# Patient Record
Sex: MALE
[De-identification: names, ages, dates, MRNs, and addresses within clinical notes are randomized per-mention and may not be internally consistent; named-entity substitution may affect disease eponyms.]

---

## 2017-11-27 ENCOUNTER — HOSPITAL ENCOUNTER (EMERGENCY)
Dept: HOSPITAL 31 - C.ER | Age: 42
Discharge: HOME | End: 2017-11-27
Payer: COMMERCIAL

## 2017-11-27 VITALS — RESPIRATION RATE: 18 BRPM | HEART RATE: 75 BPM | SYSTOLIC BLOOD PRESSURE: 136 MMHG | DIASTOLIC BLOOD PRESSURE: 78 MMHG

## 2017-11-27 VITALS — BODY MASS INDEX: 24 KG/M2

## 2017-11-27 VITALS — TEMPERATURE: 97.7 F

## 2017-11-27 VITALS — OXYGEN SATURATION: 100 %

## 2017-11-27 DIAGNOSIS — S20.212A: Primary | ICD-10-CM
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
## 2017-11-27 NOTE — C.PDOC
History Of Present Illness


41 y/o male presents to ED with complaints of left sided chest pain for 6 days. 

Patient states 6 days ago while at work was moving something and hit left side 

of chest on corner of table. Patient reports pain is worse with movement or 

breathing. Patient denies shortness of breath, coughing or any other complaints 

at this time.


Time Seen by Provider: 11/27/17 18:23


Chief Complaint (Nursing): Rib Injury


History Per: Patient


History/Exam Limitations: no limitations


Onset/Duration Of Symptoms: Days


Current Symptoms Are (Timing): Still Present





Past Medical History


Reviewed: Historical Data, Nursing Documentation, Vital Signs


Vital Signs: 


 Last Vital Signs











Temp  97.7 F   11/27/17 17:59


 


Pulse  75   11/27/17 19:18


 


Resp  18   11/27/17 19:18


 


BP  136/78   11/27/17 19:18


 


Pulse Ox  100   11/27/17 19:29














- Medical History


PMH: No Chronic Diseases


Surgical History: No Surg Hx


Family History: States: No Known Family Hx





- Social History


Hx Tobacco Use: No


Hx Alcohol Use: Yes


Hx Substance Use: No





- Immunization History


Hx Tetanus Toxoid Vaccination: Yes


Hx Influenza Vaccination: No


Hx Pneumococcal Vaccination: No





Review Of Systems


Constitutional: Negative for: Fever, Chills


Cardiovascular: Positive for: Chest Pain


Respiratory: Negative for: Cough, Shortness of Breath


Gastrointestinal: Negative for: Nausea, Vomiting


Skin: Negative for: Rash





Physical Exam





- Physical Exam


Appears: Non-toxic, No Acute Distress


Skin: Normal Color, Warm, Dry, No Rash


Head: Atraumatic, Normacephalic


Eye(s): bilateral: Normal Inspection


Oral Mucosa: Moist


Neck: Normal ROM, Supple


Chest: Symmetrical, Tenderness (Left anterior chest wall near 5ht-6th rib  (-)

crepitus), No Ecchymosis, No Subcutaneous Emphysema


Cardiovascular: Rhythm Regular


Respiratory: Normal Breath Sounds, No Rales, No Rhonchi, No Wheezing


Gastrointestinal/Abdominal: Soft, No Tenderness, No Guarding, No Rebound


Extremity: Normal ROM, Capillary Refill (<2 seconds)


Neurological/Psych: Oriented x3, Normal Motor, Normal Sensation





ED Course And Treatment


O2 Sat by Pulse Oximetry: 100 (RA)


Pulse Ox Interpretation: Normal





- Radiology


CXR: Interpreted by Me


CXR Interpretation: Yes: No Acute Disease.  No: Infiltrates, Pnemothorax





Medical Decision Making


Medical Decision Making: 





Impression: rib injury


Plan: Rib and chest xray


Progress: XRay was reviewed by me and negative for fracture. Patient advised to 

take analgesics as needed





Disposition


Counseled Patient/Family Regarding: Need For Followup, Rx Given





- Disposition


Disposition: HOME/ ROUTINE


Disposition Time: 19:12


Condition: STABLE


Additional Instructions: 


Tu radiografa muestra ortiz fractura. Es muy importante que realice un 

seguimiento con ortopedia en 1-4 duvall. Se ha aplicado ortiz frula que es un yeso 

temporal. No humedezca la frula, no la any y considere ortiz bolsa de plstico. 

Theba analgsicos segn sea necesario


Prescriptions: 


Ibuprofen [Motrin] 600 mg PO Q8 #30 tab


Instructions:  Rib Contusion (ED)


Forms:  [facility] (English)


Print Language: Slovak





- POA


Present On Arrival: None





- Clinical Impression


Clinical Impression: 


 Contusion of rib on left side








- PA / NP / Resident Statement


MD/DO has reviewed & agrees with the documentation as recorded.





- Scribe Statement


The provider has reviewed the documentation as recorded by the Scribe


Cris Broderick





All medical record entries made by the Ping were at my direction and 

personally dictated by me. I have reviewed the chart and agree that the record 

accurately reflects my personal performance of the history, physical exam, 

medical decision making, and the department course for this patient. I have 

also personally directed, reviewed, and agree with the discharge instructions 

and disposition.

## 2017-11-28 NOTE — RAD
PROCEDURE:  Radiographs of the Chest and Left Ribs.



HISTORY:

left rib pain 5-7 for 2 days s.p injury



COMPARISON:

None available. 



TECHNIQUE:

Frontal radiograph of the chest and multiple oblique radiographs of 

the left ribs were obtained.



FINDINGS:



LEFT RIBS:

No fracture or focal lesion visualized.



LUNGS:

Clear.



PLEURA:

No pneumothorax or pleural fluid.



CARDIOVASCULAR:

Normal sized heart. No pulmonary vascular congestion.



OTHER FINDINGS:

None.



IMPRESSION:

Unremarkable radiographs of the chest and left ribs. No left rib 

fracture.

## 2017-12-16 ENCOUNTER — HOSPITAL ENCOUNTER (EMERGENCY)
Dept: HOSPITAL 31 - C.ER | Age: 42
Discharge: HOME | End: 2017-12-16
Payer: COMMERCIAL

## 2017-12-16 VITALS
DIASTOLIC BLOOD PRESSURE: 86 MMHG | HEART RATE: 91 BPM | TEMPERATURE: 98.2 F | SYSTOLIC BLOOD PRESSURE: 136 MMHG | RESPIRATION RATE: 19 BRPM | OXYGEN SATURATION: 100 %

## 2017-12-16 VITALS — BODY MASS INDEX: 24 KG/M2

## 2017-12-16 DIAGNOSIS — S20.212A: Primary | ICD-10-CM
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
## 2017-12-16 NOTE — C.PDOC
History Of Present Illness


42 year old male presents to the ED c/o CP and SOB starting today. Patient 

reports he feel on the left side of his chest about 15 days ago and since then 

he has been feeling CP and SOB. Patient reports the pain worsens with movement, 

deep breaths and sleeping on his left side. Patient denies fever, nausea, vomit

, diarrhea, headache, blurry vision, LOC. 


Time Seen by Provider: 12/16/17 19:41


Chief Complaint (Nursing): Chest Pain


History Per: Patient


History/Exam Limitations: no limitations


Onset/Duration Of Symptoms: Days, Waxing/Waning


Quality: "Pain"


Modifying Factors: None


Exacerbating Factors: None


Alleviating Factors: None


Recent travel outside of the [place]: No


Additional History Per: Patient





Past Medical History


Reviewed: Historical Data, Nursing Documentation, Vital Signs


Vital Signs: 


 Last Vital Signs











Temp  97.7 F   12/16/17 19:46


 


Pulse  108 H  12/16/17 19:46


 


Resp  16   12/16/17 19:46


 


BP  156/106 H  12/16/17 19:46


 


Pulse Ox  98   12/16/17 20:13














- Medical History


PMH: No Chronic Diseases


Surgical History: No Surg Hx


Family History: States: Unknown Family Hx





- Social History


Hx Tobacco Use: No


Hx Alcohol Use: Yes


Hx Substance Use: No





- Immunization History


Hx Tetanus Toxoid Vaccination: Yes


Hx Influenza Vaccination: No


Hx Pneumococcal Vaccination: No





Review Of Systems


Constitutional: Negative for: Fever, Chills


Cardiovascular: Positive for: Chest Pain, Palpitations


Respiratory: Positive for: Shortness of Breath.  Negative for: Cough


Gastrointestinal: Negative for: Nausea, Vomiting, Abdominal Pain


Musculoskeletal: Negative for: Neck Pain, Back Pain


Skin: Negative for: Rash


Neurological: Negative for: Weakness, Numbness, Headache





Physical Exam





- Physical Exam


Appears: Non-toxic, No Acute Distress


Skin: Normal Color, Warm, Dry


Head: Atraumatic, Normacephalic


Nose: No Discharge, No Deformity


Oral Mucosa: Moist


Neck: Normal ROM, Supple


Chest: Symmetrical


Cardiovascular: Rhythm Regular, No Murmur


Respiratory: Normal Breath Sounds, No Rales, No Rhonchi, No Wheezing


Gastrointestinal/Abdominal: Soft, No Tenderness, No Distention, No Rebound


Extremity: Normal ROM, No Pedal Edema, No Calf Tenderness, No Swelling


Neurological/Psych: Oriented x3, Normal Speech, Normal Cognition


Gait: Steady





ED Course And Treatment


O2 Sat by Pulse Oximetry: 98 (On RA)


Pulse Ox Interpretation: Normal





Medical Decision Making


Medical Decision Making: 


Impression : CP and SOB


Plan:


* EKG


* CXR








Disposition





- Disposition


Disposition: HOME/ ROUTINE


Disposition Time: 21:00


Condition: STABLE


Prescriptions: 


Tramadol HCl [Ultram] 50 mg PO TID PRN #20 tablet


 PRN Reason: Pain, Mild (1-3)


Instructions:  Chest Wall Pain (ED)


Forms:  [facility] (English)





- Clinical Impression


Clinical Impression: 


 Chest wall contusion








- Scribe Statement


The provider has reviewed the documentation as recorded by the Scribe





Keaton Leavitt





All medical record entries made by the Scribe were at my direction and 

personally dictated by me. I have reviewed the chart and agree that the record 

accurately reflects my personal performance of the history, physical exam, 

medical decision making, and the department course for this patient. I have 

also personally directed, reviewed, and agree with the discharge instructions 

and disposition.

## 2017-12-17 NOTE — RAD
Chest x-ray two views 



History: Chest pain. 



Comparison: 11/27/2017 



Findings: 



No focal infiltrate or effusion. 



Heart size normal limits. 



Impression: 



No focal infiltrate or effusion.

## 2018-01-17 ENCOUNTER — HOSPITAL ENCOUNTER (EMERGENCY)
Dept: HOSPITAL 31 - C.ER | Age: 43
Discharge: HOME | End: 2018-01-17
Payer: COMMERCIAL

## 2018-01-17 VITALS
DIASTOLIC BLOOD PRESSURE: 96 MMHG | OXYGEN SATURATION: 99 % | HEART RATE: 90 BPM | TEMPERATURE: 98.1 F | RESPIRATION RATE: 20 BRPM | SYSTOLIC BLOOD PRESSURE: 150 MMHG

## 2018-01-17 VITALS — BODY MASS INDEX: 24 KG/M2

## 2018-01-17 DIAGNOSIS — R07.89: Primary | ICD-10-CM

## 2018-01-17 LAB
ALBUMIN SERPL-MCNC: 4.2 G/DL (ref 3.5–5)
ALBUMIN/GLOB SERPL: 1.4 {RATIO} (ref 1–2.1)
ALT SERPL-CCNC: 37 U/L (ref 21–72)
APTT BLD: 34 SECONDS (ref 21–34)
AST SERPL-CCNC: 27 U/L (ref 17–59)
BASOPHILS # BLD AUTO: 0.1 K/UL (ref 0–0.2)
BASOPHILS NFR BLD: 0.7 % (ref 0–2)
BILIRUB UR-MCNC: NEGATIVE MG/DL
BUN SERPL-MCNC: 11 MG/DL (ref 9–20)
CALCIUM SERPL-MCNC: 9.1 MG/DL (ref 8.6–10.4)
D DIMER: < 200 NG/MLDDU (ref 0–243)
EOSINOPHIL # BLD AUTO: 0.1 K/UL (ref 0–0.7)
EOSINOPHIL NFR BLD: 0.9 % (ref 0–4)
ERYTHROCYTE [DISTWIDTH] IN BLOOD BY AUTOMATED COUNT: 13.5 % (ref 11.5–14.5)
GFR NON-AFRICAN AMERICAN: > 60
GLUCOSE UR STRIP-MCNC: NORMAL MG/DL
HGB BLD-MCNC: 16.1 G/DL (ref 12–18)
INR PPP: 1.1
LEUKOCYTE ESTERASE UR-ACNC: (no result) LEU/UL
LYMPHOCYTES # BLD AUTO: 1.2 K/UL (ref 1–4.3)
LYMPHOCYTES NFR BLD AUTO: 12.5 % (ref 20–40)
MAGNESIUM SERPL-MCNC: 1.7 MG/DL (ref 1.6–2.3)
MCH RBC QN AUTO: 29.6 PG (ref 27–31)
MCHC RBC AUTO-ENTMCNC: 35.2 G/DL (ref 33–37)
MCV RBC AUTO: 84.2 FL (ref 80–94)
MONOCYTES # BLD: 0.5 K/UL (ref 0–0.8)
MONOCYTES NFR BLD: 5.1 % (ref 0–10)
NEUTROPHILS # BLD: 7.7 K/UL (ref 1.8–7)
NEUTROPHILS NFR BLD AUTO: 80.8 % (ref 50–75)
NRBC BLD AUTO-RTO: 0 % (ref 0–2)
PH UR STRIP: 8 [PH] (ref 5–8)
PLATELET # BLD: 275 K/UL (ref 130–400)
PMV BLD AUTO: 9.1 FL (ref 7.2–11.7)
PROT UR STRIP-MCNC: NEGATIVE MG/DL
PROTHROMBIN TIME: 12.4 SECONDS (ref 9.7–12.2)
RBC # BLD AUTO: 5.45 MIL/UL (ref 4.4–5.9)
RBC # UR STRIP: NEGATIVE /UL
SP GR UR STRIP: 1.02 (ref 1–1.03)
URINE NITRATE: NEGATIVE
UROBILINOGEN UR-MCNC: 4 MG/DL (ref 0.2–1)
WBC # BLD AUTO: 9.5 K/UL (ref 4.8–10.8)

## 2018-01-17 PROCEDURE — 83735 ASSAY OF MAGNESIUM: CPT

## 2018-01-17 PROCEDURE — 80345 DRUG SCREENING BARBITURATES: CPT

## 2018-01-17 PROCEDURE — 83992 ASSAY FOR PHENCYCLIDINE: CPT

## 2018-01-17 PROCEDURE — 85610 PROTHROMBIN TIME: CPT

## 2018-01-17 PROCEDURE — 84484 ASSAY OF TROPONIN QUANT: CPT

## 2018-01-17 PROCEDURE — 85025 COMPLETE CBC W/AUTO DIFF WBC: CPT

## 2018-01-17 PROCEDURE — 80349 CANNABINOIDS NATURAL: CPT

## 2018-01-17 PROCEDURE — 81001 URINALYSIS AUTO W/SCOPE: CPT

## 2018-01-17 PROCEDURE — 80053 COMPREHEN METABOLIC PANEL: CPT

## 2018-01-17 PROCEDURE — 96374 THER/PROPH/DIAG INJ IV PUSH: CPT

## 2018-01-17 PROCEDURE — 99284 EMERGENCY DEPT VISIT MOD MDM: CPT

## 2018-01-17 PROCEDURE — 80358 DRUG SCREENING METHADONE: CPT

## 2018-01-17 PROCEDURE — 85730 THROMBOPLASTIN TIME PARTIAL: CPT

## 2018-01-17 PROCEDURE — 80324 DRUG SCREEN AMPHETAMINES 1/2: CPT

## 2018-01-17 PROCEDURE — 80346 BENZODIAZEPINES1-12: CPT

## 2018-01-17 PROCEDURE — 80361 OPIATES 1 OR MORE: CPT

## 2018-01-17 PROCEDURE — 80353 DRUG SCREENING COCAINE: CPT

## 2018-01-17 PROCEDURE — 85378 FIBRIN DEGRADE SEMIQUANT: CPT

## 2018-01-17 NOTE — C.PDOC
History Of Present Illness


Pt c/o left lower chest/side pain after injury 3 months ago. 


Time Seen by Provider: 01/17/18 14:25


Chief Complaint (Nursing): Rib Injury


History Per: Patient


Onset/Duration Of Symptoms: Days (about 3 months)


Current Symptoms Are (Timing): Still Present


Severity: Moderate


Quality: "Pain"


Associated Symptoms: denies: Diaphoresis, Syncope


Modifying Factors: Other Indicated Below


Exacerbating Factors: Turning, Movement, Deep Breathing


Additional History Per: Prior Records





Past Medical History


Reviewed: Historical Data, Nursing Documentation, Vital Signs


Vital Signs: 





 Last Vital Signs











Temp  98.4 F   01/17/18 14:16


 


Pulse  91 H  01/17/18 14:16


 


Resp  18   01/17/18 14:16


 


BP  167/111 H  01/17/18 14:16


 


Pulse Ox  97   01/17/18 14:16














- Medical History


PMH: No Chronic Diseases


Surgical History: No Surg Hx


Family History: States: Unknown Family Hx





- Social History


Hx Tobacco Use: No


Hx Alcohol Use: Yes


Hx Substance Use: No





- Immunization History


Hx Tetanus Toxoid Vaccination: Yes


Hx Influenza Vaccination: No


Hx Pneumococcal Vaccination: No





Review Of Systems


Except As Marked, All Systems Reviewed And Found Negative.


Constitutional: Negative for: Fever, Weakness


Respiratory: Negative for: Hemoptysis


Gastrointestinal: Negative for: Vomiting, Abdominal Pain


Musculoskeletal: Negative for: Neck Pain, Leg Pain


Neurological: Negative for: Weakness, Numbness





Physical Exam





- Physical Exam


Appears: Non-toxic, No Acute Distress


Skin: Normal Color, Warm, Dry, No Rash


Head: Atraumatic, Normacephalic


Eye(s): bilateral: Normal Inspection, PERRL, EOMI


Neck: Normal ROM, Supple


Chest: Symmetrical, No Deformity, Tenderness (left lower lateral chest wall), 

No Ecchymosis, No Subcutaneous Emphysema


Cardiovascular: Rhythm Regular


Respiratory: Normal Breath Sounds, No Accessory Muscle Use


Gastrointestinal/Abdominal: Soft, No Tenderness


Back: No Vertebral Tenderness


Extremity: Normal ROM, No Pedal Edema, No Calf Tenderness


Neurological/Psych: Oriented x3, Normal Motor, Normal Sensation





ED Course And Treatment





- Laboratory Results


Result Diagrams: 


 01/17/18 14:53





 01/17/18 14:53


Lab Interpretation: No Acute Changes


O2 Sat by Pulse Oximetry: 97


Pulse Ox Interpretation: Normal


Reassessment Condition: Improved





Disposition


Counseled Patient/Family Regarding: Studies Performed, Diagnosis, Need For 

Followup, Rx Given





- Disposition


Referrals: 


[facility] [Outside]


Disposition: HOME/ ROUTINE


Disposition Time: 15:47


Condition: IMPROVED


Additional Instructions: 


Follow up in the clinic for further evaluation and treatment. Return to the ER 

if you develop shortness of breath, fever, cough, worsening of symptoms or if 

you have any other concerns. 


Prescriptions: 


Naproxen [Naprosyn] 1 tab PO BID PRN #30 tab


 PRN Reason: Pain


Instructions:  Chest Wall Pain (ED)





- Clinical Impression


Clinical Impression: 


 Musculoskeletal chest pain

## 2018-02-23 ENCOUNTER — HOSPITAL ENCOUNTER (EMERGENCY)
Dept: HOSPITAL 31 - C.ER | Age: 43
Discharge: HOME | End: 2018-02-23
Payer: COMMERCIAL

## 2018-02-23 VITALS
SYSTOLIC BLOOD PRESSURE: 147 MMHG | RESPIRATION RATE: 18 BRPM | OXYGEN SATURATION: 99 % | DIASTOLIC BLOOD PRESSURE: 99 MMHG | HEART RATE: 86 BPM | TEMPERATURE: 98 F

## 2018-02-23 VITALS — BODY MASS INDEX: 24 KG/M2

## 2018-02-23 DIAGNOSIS — J02.9: Primary | ICD-10-CM

## 2018-02-23 NOTE — C.PDOC
History Of Present Illness


42 yr old male presents to the ER with complaints of fever, runny nose, dry 

cough and sore throat for the past 1 week, with worsening sore throat since 

yesterday. Patient reports difficulty swallowing due to throat pain and 

swelling since today AM, noted some neck swelling. Patient denies lethargy, 

sever headache, dizziness, visual changes, drooling, trismus, CP, wheezing, SOB

, abd. pain, nausea, vomiting , diarrhea, back pain, UTI sx. Ambulate to ED for 

evaluation, not in any apparent distress.


Time Seen by Provider: 02/23/18 17:37


Chief Complaint (Nursing): Cough, Cold, Congestion


History Per: Patient


History/Exam Limitations: no limitations


Onset/Duration Of Symptoms: Days (1 week)





Past Medical History


Reviewed: Historical Data, Nursing Documentation, Vital Signs


Vital Signs: 


 Last Vital Signs











Temp  98 F   02/23/18 17:28


 


Pulse  86   02/23/18 17:28


 


Resp  18   02/23/18 17:28


 


BP  147/99 H  02/23/18 17:28


 


Pulse Ox  99   02/23/18 18:18











Family History: States: No Known Family Hx





- Social History


Hx Tobacco Use: No


Hx Alcohol Use: No


Hx Substance Use: No





- Immunization History


Hx Tetanus Toxoid Vaccination: Yes


Hx Influenza Vaccination: No


Hx Pneumococcal Vaccination: No





Review Of Systems


Except As Marked, All Systems Reviewed And Found Negative.


Constitutional: Positive for: Fever (subjective)


ENT: Positive for: Nose Discharge (runny nose), Throat Pain (sore throat)


Respiratory: Positive for: Cough (dry)


Gastrointestinal: Negative for: Nausea, Vomiting


Neurological: Negative for: Headache





Physical Exam





- Physical Exam


Appears: Well, Non-toxic, No Acute Distress


Skin: Normal Color, Warm, Dry, No Rash


Head: Normacephalic


Eye(s): bilateral: PERRL


Ear(s): Bilateral: Normal


Nose: No Flaring, Discharge (B/L nasal congestion with clear rhinorrhea)


Oral Mucosa: Moist, No Drooling


Tongue: Normal Appearing


Lips: Normal Appearing


Throat: Erythema (mod B/L), Exudate (scant b/L), No Drooling, Other (uvula 

midline, no edema.)


Neck: Trachea Midline, Supple, Other ((-) meningeal sign)


Lymphatic: Adenopathy (mild b/L cervical adenopathy)


Cardiovascular: Rhythm Regular, No Murmur


Respiratory: No Decreased Breath Sounds, No Accessory Muscle Use, No Stridor, 

No Wheezing


Gastrointestinal/Abdominal: Soft, No Tenderness, No Distention, No Guarding, No 

Rebound, No Hernia


Back: No CVA Tenderness


Extremity: Normal ROM, No Deformity, No Swelling


Neurological/Psych: Oriented x3, Normal Speech





ED Course And Treatment


O2 Sat by Pulse Oximetry: 99 (RA)


Pulse Ox Interpretation: Normal


Progress Note: On re-eval, pt is Afebrile, hemodynamicaly stable.  Non-toxic, 

tolerate Po well in ED.  PUlseOx 99% RA.  neck: Supple, (-) meningeal sign.  ENT

: exam c/w acute pharyngitis. uvula midline, no edema.  Lungs: CTA B/L, BS 

equal B/L.  Abd: benign.  Neuorlogicaly intact.  Pt advised.  ref. to f/u with 

PMD in 1-2 days for re-evAl.  return to ED if any worsening or new changes.





Medical Decision Making


Medical Decision Making: 


PLAN:


* Augmentin PO


* Motrin PO 


* Prednisone PO 








Disposition


Counseled Patient/Family Regarding: Studies Performed, Diagnosis, Need For 

Followup, Rx Given





- Disposition


Referrals: 


[facility] [Outside]


Disposition: HOME/ ROUTINE


Disposition Time: 18:06


Condition: STABLE


Additional Instructions: 


ENCOURAGE FLUIDS


TAKE MEDICATION AS PRESCRIBED


FOLLOW UP WITH PMD IN 2-3 DAYS FOR RE-EVALUATION.


RETURN TO ED IF ANY WORSENING OR NEW CHANGES.


Prescriptions: 


Amoxicillin/Clavulanate [Augmentin 875 MG-125 MG] 1 tab PO BID #14 tab


Benzonatate [Tessalon Perle] 100 mg PO TID #14 capsule


Prednisone [Deltasone] 40 mg PO DAILY #6 tablet


Instructions:  Sore Throat, Adult (DC)


Forms:  [employer] (English)


Print Language: Israeli





- Clinical Impression


Clinical Impression: 


 Pharyngitis








- PA / NP / Resident Statement


MD/DO has reviewed & agrees with the documentation as recorded.





- Scribe Statement


The provider has reviewed the documentation as recorded by the Scribe


Sumi Lora





All medical record entries made by the Scribe were at my direction and 

personally dictated by me. I have reviewed the chart and agree that the record 

accurately reflects my personal performance of the history, physical exam, 

medical decision making, and the department course for this patient. I have 

also personally directed, reviewed, and agree with the discharge instructions 

and disposition.